# Patient Record
Sex: FEMALE | Race: WHITE | NOT HISPANIC OR LATINO | URBAN - METROPOLITAN AREA
[De-identification: names, ages, dates, MRNs, and addresses within clinical notes are randomized per-mention and may not be internally consistent; named-entity substitution may affect disease eponyms.]

---

## 2020-07-28 ENCOUNTER — OFFICE VISIT (OUTPATIENT)
Dept: URBAN - METROPOLITAN AREA TELEHEALTH 2 | Facility: TELEHEALTH | Age: 60
End: 2020-07-28
Payer: OTHER GOVERNMENT

## 2020-07-28 DIAGNOSIS — R13.10 ESOPHAGEAL DYSPHAGIA: ICD-10-CM

## 2020-07-28 DIAGNOSIS — F45.8 GLOBUS SENSATION: ICD-10-CM

## 2020-07-28 DIAGNOSIS — R13.19 CERVICAL DYSPHAGIA: ICD-10-CM

## 2020-07-28 DIAGNOSIS — K21.9 ACID REFLUX: ICD-10-CM

## 2020-07-28 PROCEDURE — 1036F TOBACCO NON-USER: CPT | Performed by: INTERNAL MEDICINE

## 2020-07-28 PROCEDURE — G9903 PT SCRN TBCO ID AS NON USER: HCPCS | Performed by: INTERNAL MEDICINE

## 2020-07-28 PROCEDURE — 3017F COLORECTAL CA SCREEN DOC REV: CPT | Performed by: INTERNAL MEDICINE

## 2020-07-28 PROCEDURE — 99203 OFFICE O/P NEW LOW 30 MIN: CPT | Performed by: INTERNAL MEDICINE

## 2020-07-28 PROCEDURE — G8427 DOCREV CUR MEDS BY ELIG CLIN: HCPCS | Performed by: INTERNAL MEDICINE

## 2020-07-28 PROCEDURE — G8417 CALC BMI ABV UP PARAM F/U: HCPCS | Performed by: INTERNAL MEDICINE

## 2020-07-28 RX ORDER — ASPIRIN 81 MG/1
1 TABLET TABLET, CHEWABLE ORAL ONCE A DAY
Status: ACTIVE | COMMUNITY

## 2020-07-28 RX ORDER — LOSARTAN POTASSIUM 50 MG/1
1 TABLET TABLET ORAL ONCE A DAY
Status: ACTIVE | COMMUNITY

## 2020-07-28 RX ORDER — PRAVASTATIN SODIUM 40 MG/1
1 TABLET TABLET ORAL ONCE A DAY
Status: ACTIVE | COMMUNITY

## 2020-07-28 RX ORDER — HYDROCHLOROTHIAZIDE 12.5 MG/1
1 TABLET IN THE MORNING TABLET ORAL ONCE A DAY
Status: ACTIVE | COMMUNITY

## 2020-07-28 NOTE — HPI-TODAY'S VISIT:
the patient presents by a  Tele conference visit today for evaluation of chronic gastroesophageal reflux disease. she tells me that she has been on multiple PPI agents down through the years.  She is currently taking omeprazole twice daily.  She is having some breakthrough and refractory reflux symptoms.  She does describe vague esophageal dysphagia.  She tells me that her colonoscopy was done about 3-1/2 years ago and had a few polyps removed.  There is no family history of colon cancer.

## 2020-08-27 ENCOUNTER — CLAIMS CREATED FROM THE CLAIM WINDOW (OUTPATIENT)
Dept: RURAL MEDICAL CENTER 4 | Facility: MEDICAL CENTER | Age: 60
End: 2020-08-27
Payer: OTHER GOVERNMENT

## 2020-08-27 DIAGNOSIS — K21.9 ACID REFLUX: ICD-10-CM

## 2020-08-27 DIAGNOSIS — K29.40 ATROPHIC GASTRITIS: ICD-10-CM

## 2020-08-27 PROCEDURE — 43239 EGD BIOPSY SINGLE/MULTIPLE: CPT | Performed by: INTERNAL MEDICINE

## 2020-08-27 RX ORDER — HYDROCHLOROTHIAZIDE 12.5 MG/1
1 TABLET IN THE MORNING TABLET ORAL ONCE A DAY
Status: ACTIVE | COMMUNITY

## 2020-08-27 RX ORDER — LOSARTAN POTASSIUM 50 MG/1
1 TABLET TABLET ORAL ONCE A DAY
Status: ACTIVE | COMMUNITY

## 2020-08-27 RX ORDER — PRAVASTATIN SODIUM 40 MG/1
1 TABLET TABLET ORAL ONCE A DAY
Status: ACTIVE | COMMUNITY

## 2020-08-27 RX ORDER — ASPIRIN 81 MG/1
1 TABLET TABLET, CHEWABLE ORAL ONCE A DAY
Status: ACTIVE | COMMUNITY

## 2022-03-14 NOTE — PHYSICAL EXAM SKIN:
no rashes , no suspicious lesions , no areas of discoloration Enbrel Counseling:  I discussed with the patient the risks of etanercept including but not limited to myelosuppression, immunosuppression, autoimmune hepatitis, demyelinating diseases, lymphoma, and infections.  The patient understands that monitoring is required including a PPD at baseline and must alert us or the primary physician if symptoms of infection or other concerning signs are noted.

## 2023-03-04 ENCOUNTER — WEB ENCOUNTER (OUTPATIENT)
Dept: RURAL CLINIC 2 | Facility: CLINIC | Age: 63
End: 2023-03-04

## 2023-03-08 ENCOUNTER — LAB OUTSIDE AN ENCOUNTER (OUTPATIENT)
Dept: RURAL CLINIC 2 | Facility: CLINIC | Age: 63
End: 2023-03-08

## 2023-03-08 ENCOUNTER — OFFICE VISIT (OUTPATIENT)
Dept: RURAL CLINIC 2 | Facility: CLINIC | Age: 63
End: 2023-03-08
Payer: OTHER GOVERNMENT

## 2023-03-08 ENCOUNTER — DASHBOARD ENCOUNTERS (OUTPATIENT)
Age: 63
End: 2023-03-08

## 2023-03-08 VITALS
WEIGHT: 169 LBS | HEIGHT: 57 IN | DIASTOLIC BLOOD PRESSURE: 76 MMHG | TEMPERATURE: 97.1 F | BODY MASS INDEX: 36.46 KG/M2 | HEART RATE: 68 BPM | SYSTOLIC BLOOD PRESSURE: 117 MMHG

## 2023-03-08 DIAGNOSIS — K59.09 CHRONIC CONSTIPATION: ICD-10-CM

## 2023-03-08 DIAGNOSIS — K21.9 GASTROESOPHAGEAL REFLUX DISEASE, UNSPECIFIED WHETHER ESOPHAGITIS PRESENT: ICD-10-CM

## 2023-03-08 DIAGNOSIS — Z86.010 HISTORY OF COLON POLYPS: ICD-10-CM

## 2023-03-08 DIAGNOSIS — I25.2 HISTORY OF MYOCARDIAL INFARCTION: ICD-10-CM

## 2023-03-08 PROBLEM — 235595009: Status: ACTIVE | Noted: 2023-03-08

## 2023-03-08 PROBLEM — 236069009: Status: ACTIVE | Noted: 2023-03-08

## 2023-03-08 PROBLEM — 428283002: Status: ACTIVE | Noted: 2023-03-08

## 2023-03-08 PROBLEM — 79922009: Status: ACTIVE | Noted: 2023-03-08

## 2023-03-08 PROBLEM — 399211009: Status: ACTIVE | Noted: 2023-03-08

## 2023-03-08 PROCEDURE — 99203 OFFICE O/P NEW LOW 30 MIN: CPT | Performed by: INTERNAL MEDICINE

## 2023-03-08 PROCEDURE — 99243 OFF/OP CNSLTJ NEW/EST LOW 30: CPT | Performed by: INTERNAL MEDICINE

## 2023-03-08 RX ORDER — POLYETHYLENE GLYCOL 3350, SODIUM SULFATE ANHYDROUS, SODIUM BICARBONATE, SODIUM CHLORIDE, POTASSIUM CHLORIDE 236; 22.74; 6.74; 5.86; 2.97 G/4L; G/4L; G/4L; G/4L; G/4L
AS DIRECTED POWDER, FOR SOLUTION ORAL 1
Qty: 1 | Refills: 1 | OUTPATIENT
Start: 2023-03-08 | End: 2023-03-10

## 2023-03-08 RX ORDER — ASPIRIN 81 MG/1
1 TABLET TABLET, CHEWABLE ORAL ONCE A DAY
Status: ACTIVE | COMMUNITY

## 2023-03-08 RX ORDER — CYCLOBENZAPRINE HYDROCHLORIDE 10 MG/1
1 TABLET AT BEDTIME AS NEEDED TABLET, FILM COATED ORAL ONCE A DAY
Status: ACTIVE | COMMUNITY

## 2023-03-08 RX ORDER — LOSARTAN POTASSIUM 50 MG/1
1 TABLET TABLET ORAL ONCE A DAY
Status: ACTIVE | COMMUNITY

## 2023-03-08 RX ORDER — OMEPRAZOLE 20 MG/1
1 CAPSULE 30 MINUTES BEFORE MORNING MEAL CAPSULE, DELAYED RELEASE ORAL ONCE A DAY
Status: ACTIVE | COMMUNITY

## 2023-03-08 RX ORDER — PRAVASTATIN SODIUM 40 MG/1
1 TABLET TABLET ORAL ONCE A DAY
Status: DISCONTINUED | COMMUNITY

## 2023-03-08 RX ORDER — HYDROCHLOROTHIAZIDE 12.5 MG/1
1 TABLET IN THE MORNING TABLET ORAL ONCE A DAY
Status: ACTIVE | COMMUNITY

## 2023-03-08 RX ORDER — SUCRALFATE 1 G/1
1 TABLET ON AN EMPTY STOMACH TABLET ORAL TWICE A DAY
Status: ACTIVE | COMMUNITY

## 2023-03-08 NOTE — HPI-TODAY'S VISIT:
The patient is a 62-year-old female, known to our practice and presents on referral from Ryanne Lion NP for the evaluation of a colonoscopy for history of colon polyps.  A copy of this document to be sent to them referring provider.  Her last colonoscopy was completed over 5 years ago and reports benign colon polyps were found.  She reports chronic constipation since November and is currently taking Senokot and/or stool softeners and is helping.  She continues to have have hard stool and straining intermittently.  She denies rectal bleeding or lower abdominal pain.    Patient has chronic GERD and over the past couple of months she reports epigastric abdominal pain as well as belching despite taking omeprazole 20 mg daily. Mild to moderate epigastric pain occurs with and without eating and seems to occur when drinking coffee.   Sucralfate was recently prescribed for her and is helping.  Her last upper endoscopy was completed 2-1/2 years ago with findings of chronic inactive gastritis otherwise normal.  Past medical history of myocardial infarction, stable hypertension, hyperlipidemia and type 2 diabetes.

## 2023-03-21 PROBLEM — 698065002 ACID REFLUX: Status: ACTIVE | Noted: 2023-03-21

## 2023-06-20 ENCOUNTER — OFFICE VISIT (OUTPATIENT)
Dept: RURAL MEDICAL CENTER 4 | Facility: MEDICAL CENTER | Age: 63
End: 2023-06-20